# Patient Record
Sex: FEMALE | Race: WHITE
[De-identification: names, ages, dates, MRNs, and addresses within clinical notes are randomized per-mention and may not be internally consistent; named-entity substitution may affect disease eponyms.]

---

## 2018-08-01 ENCOUNTER — HOSPITAL ENCOUNTER (OUTPATIENT)
Dept: HOSPITAL 36 - RAD | Age: 58
Discharge: NURSING, REHAB OR LONG-TERM CARE | End: 2018-08-01
Payer: MEDICAID

## 2018-08-01 DIAGNOSIS — N63.24: ICD-10-CM

## 2018-08-01 DIAGNOSIS — N63.13: Primary | ICD-10-CM

## 2018-08-01 NOTE — DIAGNOSTIC IMAGING REPORT
Ultrasound of the bilateral breasts



HISTORY: Nodules, follow-up



COMPARISON: No images available.  Ultrasound report from outside

hospital is available.



Technique: Sonography of the bilateral breasts was performed in multiple

planes.



FINDINGS:



Exam of the right breast demonstrates glandular and fatty breast tissue.

 There is a hypoechoic nodule at 6:00 measuring 0.5 x 0.7 x 0.6 cm. 

There is also an additional small nodule possibly a cyst located also at

6:00 measuring 0.3 cm.



Exam of the left breast demonstrates glandular fatty breast tissue.  No

discrete focal lesions identified.



IMPRESSION:



Indeterminate Hypoechoic nodule of the right breast at 6:00 measuring

0.5 x 0.7 x 0.6 cm.  Correlation needs to be made with previous

ultrasound as this finding may have been seen on prior ultrasound at

outside hospital.



Additional nodule, possibly a small cyst, also at 6:00 measuring 0.3 cm.

 Again correlation needs to be made with old exams.



A follow-up mammogram of both breasts is also recommended for further

assessment and completion of the exam.  



BI-RADS 0, incomplete further assessment is recommended.

## 2019-04-04 ENCOUNTER — HOSPITAL ENCOUNTER (OUTPATIENT)
Dept: HOSPITAL 36 - RAD | Age: 59
Discharge: NURSING, REHAB OR LONG-TERM CARE | End: 2019-04-04
Payer: MEDICAID

## 2019-04-04 DIAGNOSIS — N63.14: Primary | ICD-10-CM

## 2019-04-04 NOTE — DIAGNOSTIC IMAGING REPORT
Ultrasound of the bilateral breasts



HISTORY: Nodules



Comparison: Previous right breast nodules seen on ultrasound on 8/1/2018



Technique: Sonography of the bilateral breasts was performed in multiple

planes.



FINDINGS:



Exam of the right breast demonstrates glandular and fatty breast tissue.

 Again noted is an indeterminate hypoechoic nodule within the right

breast at 5:00 measuring 0.6 x 0.4 x 0.7 cm.  This may correspond to

previous nodule seen at 6:00.



No other focal lesions identified.



Exam of left breast demonstrates glandular fatty breast tissue.  No

focal lesions identified.



IMPRESSION: 



Indeterminate hypoechoic nodularity of the right breast at 5:00

measuring 0.6 x 0.7 x 0.4 cm.  This may correspond to nodule seen at

6:00 on previous examination. Findings are indeterminate and correlation

with recent mammography is needed.  If clinically indicated MRI

follow-up may also be of value.



BI-RADS 0, incomplete.  Further assessment is needed including follow-up

mammogram of both breasts.